# Patient Record
Sex: FEMALE | Race: BLACK OR AFRICAN AMERICAN | Employment: UNEMPLOYED | URBAN - METROPOLITAN AREA
[De-identification: names, ages, dates, MRNs, and addresses within clinical notes are randomized per-mention and may not be internally consistent; named-entity substitution may affect disease eponyms.]

---
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
## 2023-06-22 ENCOUNTER — HOSPITAL ENCOUNTER (OUTPATIENT)
Facility: HOSPITAL | Age: 18
Discharge: HOME | End: 2023-06-22
Attending: OBSTETRICS & GYNECOLOGY | Admitting: OBSTETRICS & GYNECOLOGY
Payer: COMMERCIAL

## 2023-06-22 VITALS
HEIGHT: 65 IN | RESPIRATION RATE: 17 BRPM | TEMPERATURE: 98.8 F | BODY MASS INDEX: 33.99 KG/M2 | WEIGHT: 204 LBS | OXYGEN SATURATION: 100 % | HEART RATE: 76 BPM | DIASTOLIC BLOOD PRESSURE: 62 MMHG | SYSTOLIC BLOOD PRESSURE: 104 MMHG

## 2023-06-22 PROBLEM — O26.899 ABDOMINAL PAIN AFFECTING PREGNANCY: Status: ACTIVE | Noted: 2023-06-22

## 2023-06-22 PROBLEM — R10.9 ABDOMINAL PAIN AFFECTING PREGNANCY: Status: ACTIVE | Noted: 2023-06-22

## 2023-06-22 LAB
ABO + RH BLD: NORMAL
APPEARANCE UR: CLEAR
BACTERIA URNS QL MICRO: NEGATIVE /HPF
BASOPHILS # BLD: 0 K/UL (ref 0–0.1)
BASOPHILS NFR BLD: 0 % (ref 0–1)
BILIRUB UR QL: NEGATIVE
BLOOD GROUP ANTIBODIES SERPL: NEGATIVE
COLOR UR: ABNORMAL
DIFFERENTIAL METHOD BLD: ABNORMAL
EOSINOPHIL # BLD: 0.5 K/UL (ref 0–0.4)
EOSINOPHIL NFR BLD: 5 % (ref 0–7)
EPITH CASTS URNS QL MICRO: ABNORMAL /LPF
ERYTHROCYTE [DISTWIDTH] IN BLOOD BY AUTOMATED COUNT: 14.5 % (ref 11.5–14.5)
GLUCOSE UR STRIP.AUTO-MCNC: NEGATIVE MG/DL
HCT VFR BLD AUTO: 27.3 % (ref 35–47)
HGB BLD-MCNC: 8.4 G/DL (ref 11.5–16)
HGB UR QL STRIP: NEGATIVE
HIV1 P24 AG SERPL QL IA: NONREACTIVE
HIV1+2 AB SERPL QL IA: NONREACTIVE
IMM GRANULOCYTES # BLD AUTO: 0.1 K/UL (ref 0–0.04)
IMM GRANULOCYTES NFR BLD AUTO: 1 % (ref 0–0.5)
KETONES UR QL STRIP.AUTO: 5 MG/DL
LEUKOCYTE ESTERASE UR QL STRIP.AUTO: ABNORMAL
LYMPHOCYTES # BLD: 1.2 K/UL (ref 0.8–3.5)
LYMPHOCYTES NFR BLD: 12 % (ref 12–49)
MCH RBC QN AUTO: 24.6 PG (ref 26–34)
MCHC RBC AUTO-ENTMCNC: 30.8 G/DL (ref 30–36.5)
MCV RBC AUTO: 80.1 FL (ref 80–99)
MONOCYTES # BLD: 0.6 K/UL (ref 0–1)
MONOCYTES NFR BLD: 6 % (ref 5–13)
MUCOUS THREADS URNS QL MICRO: ABNORMAL /LPF
NEUTS SEG # BLD: 7.6 K/UL (ref 1.8–8)
NEUTS SEG NFR BLD: 76 % (ref 32–75)
NITRITE UR QL STRIP.AUTO: NEGATIVE
NRBC # BLD: 0 K/UL (ref 0–0.01)
NRBC BLD-RTO: 0 PER 100 WBC
PH UR STRIP: 6 (ref 5–8)
PLATELET # BLD AUTO: 408 K/UL (ref 150–400)
PMV BLD AUTO: 9.3 FL (ref 8.9–12.9)
PROT UR STRIP-MCNC: 30 MG/DL
RBC # BLD AUTO: 3.41 M/UL (ref 3.8–5.2)
RBC #/AREA URNS HPF: ABNORMAL /HPF (ref 0–5)
SP GR UR REFRACTOMETRY: 1.03 (ref 1–1.03)
SPECIMEN EXP DATE BLD: NORMAL
UROBILINOGEN UR QL STRIP.AUTO: 4 EU/DL (ref 0.1–1)
WBC # BLD AUTO: 10 K/UL (ref 3.6–11)
WBC URNS QL MICRO: ABNORMAL /HPF (ref 0–4)

## 2023-06-22 PROCEDURE — 81001 URINALYSIS AUTO W/SCOPE: CPT

## 2023-06-22 PROCEDURE — 86803 HEPATITIS C AB TEST: CPT

## 2023-06-22 PROCEDURE — 86592 SYPHILIS TEST NON-TREP QUAL: CPT

## 2023-06-22 PROCEDURE — 86850 RBC ANTIBODY SCREEN: CPT

## 2023-06-22 PROCEDURE — 36415 COLL VENOUS BLD VENIPUNCTURE: CPT

## 2023-06-22 PROCEDURE — 99214 OFFICE O/P EST MOD 30 MIN: CPT | Performed by: OBSTETRICS & GYNECOLOGY

## 2023-06-22 PROCEDURE — 87070 CULTURE OTHR SPECIMN AEROBIC: CPT

## 2023-06-22 PROCEDURE — 96361 HYDRATE IV INFUSION ADD-ON: CPT

## 2023-06-22 PROCEDURE — 2580000003 HC RX 258: Performed by: OBSTETRICS & GYNECOLOGY

## 2023-06-22 PROCEDURE — 87389 HIV-1 AG W/HIV-1&-2 AB AG IA: CPT

## 2023-06-22 PROCEDURE — 85025 COMPLETE CBC W/AUTO DIFF WBC: CPT

## 2023-06-22 PROCEDURE — 96360 HYDRATION IV INFUSION INIT: CPT

## 2023-06-22 PROCEDURE — 99285 EMERGENCY DEPT VISIT HI MDM: CPT

## 2023-06-22 PROCEDURE — 86901 BLOOD TYPING SEROLOGIC RH(D): CPT

## 2023-06-22 PROCEDURE — 87340 HEPATITIS B SURFACE AG IA: CPT

## 2023-06-22 PROCEDURE — 86900 BLOOD TYPING SEROLOGIC ABO: CPT

## 2023-06-22 PROCEDURE — 86762 RUBELLA ANTIBODY: CPT

## 2023-06-22 PROCEDURE — 4500000201 HC EMERGENCY DEPT VISIT NO LEVEL OF CARE

## 2023-06-22 RX ORDER — SODIUM CHLORIDE, SODIUM LACTATE, POTASSIUM CHLORIDE, AND CALCIUM CHLORIDE .6; .31; .03; .02 G/100ML; G/100ML; G/100ML; G/100ML
1000 INJECTION, SOLUTION INTRAVENOUS ONCE
Status: COMPLETED | OUTPATIENT
Start: 2023-06-22 | End: 2023-06-22

## 2023-06-22 RX ORDER — ONDANSETRON 2 MG/ML
4 INJECTION INTRAMUSCULAR; INTRAVENOUS EVERY 6 HOURS PRN
Status: DISCONTINUED | OUTPATIENT
Start: 2023-06-22 | End: 2023-06-22 | Stop reason: HOSPADM

## 2023-06-22 RX ORDER — ONDANSETRON 4 MG/1
4 TABLET, ORALLY DISINTEGRATING ORAL EVERY 8 HOURS PRN
Status: DISCONTINUED | OUTPATIENT
Start: 2023-06-22 | End: 2023-06-22 | Stop reason: HOSPADM

## 2023-06-22 RX ORDER — SODIUM CHLORIDE, SODIUM LACTATE, POTASSIUM CHLORIDE, CALCIUM CHLORIDE 600; 310; 30; 20 MG/100ML; MG/100ML; MG/100ML; MG/100ML
INJECTION, SOLUTION INTRAVENOUS CONTINUOUS
Status: DISCONTINUED | OUTPATIENT
Start: 2023-06-22 | End: 2023-06-22 | Stop reason: HOSPADM

## 2023-06-22 RX ADMIN — SODIUM CHLORIDE, POTASSIUM CHLORIDE, SODIUM LACTATE AND CALCIUM CHLORIDE: 600; 310; 30; 20 INJECTION, SOLUTION INTRAVENOUS at 18:50

## 2023-06-22 RX ADMIN — SODIUM CHLORIDE, POTASSIUM CHLORIDE, SODIUM LACTATE AND CALCIUM CHLORIDE 1000 ML: 600; 310; 30; 20 INJECTION, SOLUTION INTRAVENOUS at 17:50

## 2023-06-22 ASSESSMENT — PAIN DESCRIPTION - FREQUENCY
FREQUENCY: INTERMITTENT
FREQUENCY: CONTINUOUS
FREQUENCY: INTERMITTENT

## 2023-06-22 ASSESSMENT — LIFESTYLE VARIABLES
HOW MANY STANDARD DRINKS CONTAINING ALCOHOL DO YOU HAVE ON A TYPICAL DAY: PATIENT DOES NOT DRINK
HOW OFTEN DO YOU HAVE A DRINK CONTAINING ALCOHOL: NEVER

## 2023-06-22 ASSESSMENT — PAIN DESCRIPTION - LOCATION
LOCATION: ABDOMEN

## 2023-06-22 ASSESSMENT — PAIN DESCRIPTION - DESCRIPTORS
DESCRIPTORS: CRAMPING

## 2023-06-22 ASSESSMENT — PAIN SCALES - GENERAL
PAINLEVEL_OUTOF10: 7

## 2023-06-22 NOTE — PROGRESS NOTES
1655: Received this 16y/o SBF from the ER in the wheelchair and accompanied by the ER PCT's to be evaluated for Decreased Fetal Movement, Abdominal Pain and Edema of her legs. Patient informed the writer that she has been having decreased fetal movement and abdominal contractions for a couple days and the edema started on Tuesday. Patient also informed the writer that she had Prenatal care initiated after 5 months. She rates her abdominal cramping at a \"7.5\". Gown on and urine specimen obtained. She positioned herself in the bed.     1707: Shift assessment was initiated and completed. Temp 98.8. Patient was oriented to the room and POC was reviewed. 1745: #20 Antoinette Amass was placed in the patient's left wrist per Zhane Chacko RN. She tolerated the procedure well and no infiltration noted. 1759.    1750: 1000ml bolus of  L/R initiated via the pump. No infiltration noted. 1759: Ve performed cervix: 3-4cm/50%/-2. .     1805: Dr. Yanet Martines called to Labor/Delivery. The writer informed the MD of the admission, the findings and the interventions performed. The following orders were received: Admit to observation status, obtain prenatal labs and perform a GBS specimen. 1850: 1000ml L/R added and infusing at 125ml/hr via the pump. No infiltration noted. 1855: Vaginal swab performed for a GBS and sent to the lab. Patient tolerated the procedure well.

## 2023-06-22 NOTE — ED TRIAGE NOTES
Pt arrives to ED with complaints of having contractions x 2 days, reports decreased fetal movement since 2 days ago. Pt reports she is 39 weeks pregnant today.

## 2023-06-23 LAB
HBV SURFACE AG SER QL: <0.1 INDEX
HBV SURFACE AG SER QL: NEGATIVE
HCV AB SER IA-ACNC: <0.02 INDEX
HCV AB SERPL QL IA: NONREACTIVE
RUBV IGG SERPL IA-ACNC: NORMAL IU/ML

## 2023-06-23 NOTE — PROGRESS NOTES
1905 - Bedside shift report received from Jayleen Sanford RN. Pt resting quietly. 2038 - Dr. Cecily Arce at bedside to see patient, SVE performed as: 3/50/-2. POC for pt to be discharged home and to call MD's office tomorrow morning discussed with the pt. Pt verbalized understanding. Questions addressed. Strip and labs reviewed by MD.    2108 - Discharge instructions discussed with the pt including fetal kick counts and pregnancy precautions. Instructed to keep herself hydrated and to call the office in the morning to schedule an appointment. Pt verbalized understanding. 2113 - Discharge papers signed and given to pt.    2127 - Pt declined use of wheelchair, ambulated off unit with her belongings.

## 2023-06-23 NOTE — H&P
Department of Obstetrics and Gynecology  Labor and Delivery  Attending Triage Note      SUBJECTIVE:  contractions  Limited prenatal care, c/o contractions x 1 day, no lof no bleeding.  Recent delivery 2022, +FM    OBJECTIVE    Vitals:  BP (!) 104/56   Pulse 66   Temp 98.8 °F (37.1 °C) (Oral)   Resp 16   Ht 5' 5\" (1.651 m)   Wt 204 lb (92.5 kg)   SpO2 100%   BMI 33.95 kg/m²     CONSTITUTIONAL:  awake, alert, cooperative, no apparent distress, and appears stated age  ABDOMEN:  soft, non-distended, non-tender, no masses palpated, no hepatosplenomegally    Cervix:             Dilation:  3 cm         Effacement:  50%         Station:  -4 cm         Consistency:  soft         Position:  posterior    Fetal Position:  Cephalic    Membranes:  Intact    Fetal heart rate:         Baseline Heart Rate:  150s        Accelerations:  present       Decelerations:  none       Variability:  moderate    Contraction frequency: irritability, irregular contractions        DATA:  Prenatal labs drawn    ASSESSMENT & PLAN:      Principal Problem:    Abdominal pain affecting pregnancy, false labor  Insufficient prenatal care  Plan: observation, IV hydration, patient to be discharge with office follow up and precautions

## 2023-06-24 LAB
BACTERIA SPEC CULT: NORMAL
Lab: NORMAL

## 2023-06-25 LAB — RPR SER QL: NON REACTIVE

## 2023-06-26 LAB
BACTERIA SPEC CULT: NORMAL
Lab: NORMAL

## 2023-06-28 ENCOUNTER — HOSPITAL ENCOUNTER (OUTPATIENT)
Facility: HOSPITAL | Age: 18
Setting detail: OBSERVATION
Discharge: HOME | End: 2023-06-28
Attending: OBSTETRICS & GYNECOLOGY | Admitting: OBSTETRICS & GYNECOLOGY
Payer: COMMERCIAL

## 2023-06-28 VITALS
TEMPERATURE: 98.1 F | SYSTOLIC BLOOD PRESSURE: 135 MMHG | HEART RATE: 89 BPM | DIASTOLIC BLOOD PRESSURE: 65 MMHG | RESPIRATION RATE: 16 BRPM | OXYGEN SATURATION: 100 %

## 2023-06-28 PROBLEM — Z33.1 PREGNANT STATE, INCIDENTAL: Status: ACTIVE | Noted: 2023-06-28

## 2023-06-28 PROCEDURE — 99285 EMERGENCY DEPT VISIT HI MDM: CPT

## 2023-06-28 PROCEDURE — G0378 HOSPITAL OBSERVATION PER HR: HCPCS

## 2023-06-28 PROCEDURE — 4500000002 HC ER NO CHARGE

## 2023-06-28 RX ORDER — ONDANSETRON 2 MG/ML
4 INJECTION INTRAMUSCULAR; INTRAVENOUS EVERY 6 HOURS PRN
Status: DISCONTINUED | OUTPATIENT
Start: 2023-06-28 | End: 2023-06-28 | Stop reason: HOSPADM

## 2023-06-28 RX ORDER — ONDANSETRON 4 MG/1
4 TABLET, ORALLY DISINTEGRATING ORAL EVERY 8 HOURS PRN
Status: DISCONTINUED | OUTPATIENT
Start: 2023-06-28 | End: 2023-06-28 | Stop reason: HOSPADM

## 2023-07-07 ENCOUNTER — ANESTHESIA (OUTPATIENT)
Facility: HOSPITAL | Age: 18
DRG: 560 | End: 2023-07-07
Payer: COMMERCIAL

## 2023-07-07 ENCOUNTER — HOSPITAL ENCOUNTER (INPATIENT)
Facility: HOSPITAL | Age: 18
LOS: 2 days | Discharge: HOME | DRG: 560 | End: 2023-07-09
Attending: OBSTETRICS & GYNECOLOGY | Admitting: OBSTETRICS & GYNECOLOGY
Payer: COMMERCIAL

## 2023-07-07 ENCOUNTER — ANESTHESIA EVENT (OUTPATIENT)
Facility: HOSPITAL | Age: 18
DRG: 560 | End: 2023-07-07
Payer: COMMERCIAL

## 2023-07-07 DIAGNOSIS — G89.18 POSTOPERATIVE PAIN: Primary | ICD-10-CM

## 2023-07-07 PROBLEM — Z3A.40 40 WEEKS GESTATION OF PREGNANCY: Status: ACTIVE | Noted: 2023-07-07

## 2023-07-07 LAB
ABO + RH BLD: NORMAL
AMPHET UR QL SCN: NEGATIVE
BARBITURATES UR QL SCN: NEGATIVE
BENZODIAZ UR QL: NEGATIVE
BLOOD GROUP ANTIBODIES SERPL: NEGATIVE
CANNABINOIDS UR QL SCN: POSITIVE
COCAINE UR QL SCN: NEGATIVE
ERYTHROCYTE [DISTWIDTH] IN BLOOD BY AUTOMATED COUNT: 14.6 % (ref 11.5–14.5)
HCT VFR BLD AUTO: 29.3 % (ref 35–47)
HGB BLD-MCNC: 8.8 G/DL (ref 11.5–16)
Lab: ABNORMAL
MCH RBC QN AUTO: 23.4 PG (ref 26–34)
MCHC RBC AUTO-ENTMCNC: 30 G/DL (ref 30–36.5)
MCV RBC AUTO: 77.9 FL (ref 80–99)
METHADONE UR QL: NEGATIVE
NRBC # BLD: 0 K/UL (ref 0–0.01)
NRBC BLD-RTO: 0 PER 100 WBC
OPIATES UR QL: NEGATIVE
PCP UR QL: NEGATIVE
PLATELET # BLD AUTO: 419 K/UL (ref 150–400)
PMV BLD AUTO: 9 FL (ref 8.9–12.9)
RBC # BLD AUTO: 3.76 M/UL (ref 3.8–5.2)
SPECIMEN EXP DATE BLD: NORMAL
WBC # BLD AUTO: 12.7 K/UL (ref 3.6–11)

## 2023-07-07 PROCEDURE — 1120000000 HC RM PRIVATE OB

## 2023-07-07 PROCEDURE — 7210000100 HC LABOR FEE PER 1 HR

## 2023-07-07 PROCEDURE — 6370000000 HC RX 637 (ALT 250 FOR IP): Performed by: OBSTETRICS & GYNECOLOGY

## 2023-07-07 PROCEDURE — 99283 EMERGENCY DEPT VISIT LOW MDM: CPT

## 2023-07-07 PROCEDURE — 2580000003 HC RX 258: Performed by: OBSTETRICS & GYNECOLOGY

## 2023-07-07 PROCEDURE — 36415 COLL VENOUS BLD VENIPUNCTURE: CPT

## 2023-07-07 PROCEDURE — 80307 DRUG TEST PRSMV CHEM ANLYZR: CPT

## 2023-07-07 PROCEDURE — 86901 BLOOD TYPING SEROLOGIC RH(D): CPT

## 2023-07-07 PROCEDURE — 4A1HXCZ MONITORING OF PRODUCTS OF CONCEPTION, CARDIAC RATE, EXTERNAL APPROACH: ICD-10-PCS | Performed by: OBSTETRICS & GYNECOLOGY

## 2023-07-07 PROCEDURE — 86900 BLOOD TYPING SEROLOGIC ABO: CPT

## 2023-07-07 PROCEDURE — 85027 COMPLETE CBC AUTOMATED: CPT

## 2023-07-07 PROCEDURE — 59409 OBSTETRICAL CARE: CPT | Performed by: OBSTETRICS & GYNECOLOGY

## 2023-07-07 PROCEDURE — 86850 RBC ANTIBODY SCREEN: CPT

## 2023-07-07 PROCEDURE — 7100000000 HC PACU RECOVERY - FIRST 15 MIN

## 2023-07-07 PROCEDURE — 7100000001 HC PACU RECOVERY - ADDTL 15 MIN

## 2023-07-07 PROCEDURE — 6360000002 HC RX W HCPCS

## 2023-07-07 PROCEDURE — 7220000101 HC DELIVERY VAGINAL/SINGLE

## 2023-07-07 PROCEDURE — 3700000156 HC EPIDURAL ANESTHESIA

## 2023-07-07 RX ORDER — SODIUM CHLORIDE, SODIUM LACTATE, POTASSIUM CHLORIDE, CALCIUM CHLORIDE 600; 310; 30; 20 MG/100ML; MG/100ML; MG/100ML; MG/100ML
INJECTION, SOLUTION INTRAVENOUS CONTINUOUS
Status: DISCONTINUED | OUTPATIENT
Start: 2023-07-07 | End: 2023-07-07

## 2023-07-07 RX ORDER — METHYLERGONOVINE MALEATE 0.2 MG/ML
200 INJECTION INTRAVENOUS PRN
Status: DISCONTINUED | OUTPATIENT
Start: 2023-07-07 | End: 2023-07-07

## 2023-07-07 RX ORDER — DOCUSATE SODIUM 100 MG/1
100 CAPSULE, LIQUID FILLED ORAL 2 TIMES DAILY
Status: DISCONTINUED | OUTPATIENT
Start: 2023-07-07 | End: 2023-07-07

## 2023-07-07 RX ORDER — IBUPROFEN 600 MG/1
600 TABLET ORAL 3 TIMES DAILY PRN
Qty: 30 TABLET | Refills: 0 | Status: SHIPPED | OUTPATIENT
Start: 2023-07-07

## 2023-07-07 RX ORDER — SODIUM CHLORIDE, SODIUM LACTATE, POTASSIUM CHLORIDE, AND CALCIUM CHLORIDE .6; .31; .03; .02 G/100ML; G/100ML; G/100ML; G/100ML
1000 INJECTION, SOLUTION INTRAVENOUS PRN
Status: DISCONTINUED | OUTPATIENT
Start: 2023-07-07 | End: 2023-07-07

## 2023-07-07 RX ORDER — FENTANYL/BUPIVACAINE/NS/PF 2MCG/ML-.1
10 PLASTIC BAG, INJECTION (ML) INJECTION CONTINUOUS
OUTPATIENT
Start: 2023-07-07 | End: 2023-07-09

## 2023-07-07 RX ORDER — ACETAMINOPHEN 325 MG/1
650 TABLET ORAL EVERY 4 HOURS PRN
Status: DISCONTINUED | OUTPATIENT
Start: 2023-07-07 | End: 2023-07-07

## 2023-07-07 RX ORDER — IBUPROFEN 600 MG/1
600 TABLET ORAL EVERY 8 HOURS SCHEDULED
Status: DISCONTINUED | OUTPATIENT
Start: 2023-07-07 | End: 2023-07-09 | Stop reason: HOSPADM

## 2023-07-07 RX ORDER — BUPIVACAINE HYDROCHLORIDE 2.5 MG/ML
INJECTION, SOLUTION EPIDURAL; INFILTRATION; INTRACAUDAL
Status: DISPENSED
Start: 2023-07-07 | End: 2023-07-07

## 2023-07-07 RX ORDER — SODIUM CHLORIDE 9 MG/ML
INJECTION, SOLUTION INTRAVENOUS PRN
Status: DISCONTINUED | OUTPATIENT
Start: 2023-07-07 | End: 2023-07-09

## 2023-07-07 RX ORDER — MISOPROSTOL 200 UG/1
800 TABLET ORAL PRN
Status: DISCONTINUED | OUTPATIENT
Start: 2023-07-07 | End: 2023-07-07

## 2023-07-07 RX ORDER — SODIUM CHLORIDE 0.9 % (FLUSH) 0.9 %
5-40 SYRINGE (ML) INJECTION PRN
Status: DISCONTINUED | OUTPATIENT
Start: 2023-07-07 | End: 2023-07-09

## 2023-07-07 RX ORDER — CARBOPROST TROMETHAMINE 250 UG/ML
250 INJECTION, SOLUTION INTRAMUSCULAR PRN
Status: DISCONTINUED | OUTPATIENT
Start: 2023-07-07 | End: 2023-07-07

## 2023-07-07 RX ORDER — SODIUM CHLORIDE, SODIUM LACTATE, POTASSIUM CHLORIDE, AND CALCIUM CHLORIDE .6; .31; .03; .02 G/100ML; G/100ML; G/100ML; G/100ML
1000 INJECTION, SOLUTION INTRAVENOUS ONCE
Status: COMPLETED | OUTPATIENT
Start: 2023-07-07 | End: 2023-07-07

## 2023-07-07 RX ORDER — DOCUSATE SODIUM 100 MG/1
100 CAPSULE, LIQUID FILLED ORAL 2 TIMES DAILY
Status: DISCONTINUED | OUTPATIENT
Start: 2023-07-07 | End: 2023-07-09 | Stop reason: HOSPADM

## 2023-07-07 RX ORDER — ONDANSETRON 2 MG/ML
4 INJECTION INTRAMUSCULAR; INTRAVENOUS EVERY 6 HOURS PRN
Status: DISCONTINUED | OUTPATIENT
Start: 2023-07-07 | End: 2023-07-09 | Stop reason: HOSPADM

## 2023-07-07 RX ORDER — FENTANYL CITRATE 50 UG/ML
100 INJECTION, SOLUTION INTRAMUSCULAR; INTRAVENOUS
Status: DISCONTINUED | OUTPATIENT
Start: 2023-07-07 | End: 2023-07-07

## 2023-07-07 RX ORDER — SODIUM CHLORIDE 0.9 % (FLUSH) 0.9 %
5-40 SYRINGE (ML) INJECTION EVERY 12 HOURS SCHEDULED
Status: DISCONTINUED | OUTPATIENT
Start: 2023-07-07 | End: 2023-07-09

## 2023-07-07 RX ORDER — SODIUM CHLORIDE 9 MG/ML
25 INJECTION, SOLUTION INTRAVENOUS PRN
Status: DISCONTINUED | OUTPATIENT
Start: 2023-07-07 | End: 2023-07-07

## 2023-07-07 RX ORDER — SODIUM CHLORIDE 0.9 % (FLUSH) 0.9 %
5-40 SYRINGE (ML) INJECTION PRN
Status: DISCONTINUED | OUTPATIENT
Start: 2023-07-07 | End: 2023-07-07

## 2023-07-07 RX ORDER — OXYCODONE HYDROCHLORIDE AND ACETAMINOPHEN 5; 325 MG/1; MG/1
1 TABLET ORAL EVERY 6 HOURS PRN
Qty: 15 TABLET | Refills: 0 | Status: SHIPPED | OUTPATIENT
Start: 2023-07-07 | End: 2023-07-12

## 2023-07-07 RX ORDER — MODIFIED LANOLIN
OINTMENT (GRAM) TOPICAL PRN
Status: DISCONTINUED | OUTPATIENT
Start: 2023-07-07 | End: 2023-07-09 | Stop reason: HOSPADM

## 2023-07-07 RX ORDER — SODIUM CHLORIDE 0.9 % (FLUSH) 0.9 %
5-40 SYRINGE (ML) INJECTION EVERY 12 HOURS SCHEDULED
Status: DISCONTINUED | OUTPATIENT
Start: 2023-07-07 | End: 2023-07-07

## 2023-07-07 RX ORDER — SODIUM CHLORIDE, SODIUM LACTATE, POTASSIUM CHLORIDE, AND CALCIUM CHLORIDE .6; .31; .03; .02 G/100ML; G/100ML; G/100ML; G/100ML
500 INJECTION, SOLUTION INTRAVENOUS PRN
Status: DISCONTINUED | OUTPATIENT
Start: 2023-07-07 | End: 2023-07-07

## 2023-07-07 RX ORDER — ONDANSETRON 2 MG/ML
4 INJECTION INTRAMUSCULAR; INTRAVENOUS EVERY 6 HOURS PRN
Status: DISCONTINUED | OUTPATIENT
Start: 2023-07-07 | End: 2023-07-07

## 2023-07-07 RX ADMIN — IBUPROFEN 600 MG: 600 TABLET ORAL at 23:47

## 2023-07-07 RX ADMIN — IBUPROFEN 600 MG: 600 TABLET ORAL at 17:29

## 2023-07-07 RX ADMIN — SODIUM CHLORIDE, POTASSIUM CHLORIDE, SODIUM LACTATE AND CALCIUM CHLORIDE 1000 ML: 600; 310; 30; 20 INJECTION, SOLUTION INTRAVENOUS at 06:03

## 2023-07-07 RX ADMIN — Medication 10 UNITS: at 07:22

## 2023-07-07 RX ADMIN — DOCUSATE SODIUM 100 MG: 100 CAPSULE, LIQUID FILLED ORAL at 21:02

## 2023-07-07 RX ADMIN — IBUPROFEN 600 MG: 600 TABLET ORAL at 10:05

## 2023-07-07 ASSESSMENT — PAIN SCALES - GENERAL
PAINLEVEL_OUTOF10: 5
PAINLEVEL_OUTOF10: 4

## 2023-07-07 ASSESSMENT — PAIN DESCRIPTION - LOCATION
LOCATION: ABDOMEN
LOCATION: PELVIS

## 2023-07-07 ASSESSMENT — PAIN DESCRIPTION - ORIENTATION: ORIENTATION: MID

## 2023-07-07 ASSESSMENT — PAIN DESCRIPTION - DESCRIPTORS
DESCRIPTORS: CRAMPING
DESCRIPTORS: CRAMPING

## 2023-07-08 PROCEDURE — 1120000000 HC RM PRIVATE OB

## 2023-07-08 PROCEDURE — 6370000000 HC RX 637 (ALT 250 FOR IP): Performed by: OBSTETRICS & GYNECOLOGY

## 2023-07-08 RX ORDER — ACETAMINOPHEN 500 MG
1000 TABLET ORAL EVERY 6 HOURS PRN
Status: DISCONTINUED | OUTPATIENT
Start: 2023-07-08 | End: 2023-07-09 | Stop reason: HOSPADM

## 2023-07-08 RX ADMIN — ACETAMINOPHEN 1000 MG: 500 TABLET ORAL at 09:17

## 2023-07-08 RX ADMIN — IBUPROFEN 600 MG: 600 TABLET ORAL at 15:55

## 2023-07-08 RX ADMIN — ACETAMINOPHEN 1000 MG: 500 TABLET ORAL at 21:17

## 2023-07-08 RX ADMIN — ACETAMINOPHEN 1000 MG: 500 TABLET ORAL at 01:53

## 2023-07-08 RX ADMIN — IBUPROFEN 600 MG: 600 TABLET ORAL at 06:39

## 2023-07-08 ASSESSMENT — PAIN - FUNCTIONAL ASSESSMENT: PAIN_FUNCTIONAL_ASSESSMENT: ACTIVITIES ARE NOT PREVENTED

## 2023-07-08 ASSESSMENT — PAIN SCALES - GENERAL
PAINLEVEL_OUTOF10: 5
PAINLEVEL_OUTOF10: 3
PAINLEVEL_OUTOF10: 5

## 2023-07-08 ASSESSMENT — PAIN DESCRIPTION - DESCRIPTORS
DESCRIPTORS: CRAMPING
DESCRIPTORS: CRAMPING

## 2023-07-08 ASSESSMENT — PAIN DESCRIPTION - LOCATION
LOCATION: ABDOMEN
LOCATION: ABDOMEN

## 2023-07-08 ASSESSMENT — PAIN DESCRIPTION - ORIENTATION: ORIENTATION: LOWER

## 2023-07-09 VITALS
OXYGEN SATURATION: 100 % | WEIGHT: 204 LBS | BODY MASS INDEX: 33.99 KG/M2 | RESPIRATION RATE: 18 BRPM | HEART RATE: 66 BPM | TEMPERATURE: 98.3 F | DIASTOLIC BLOOD PRESSURE: 74 MMHG | HEIGHT: 65 IN | SYSTOLIC BLOOD PRESSURE: 121 MMHG

## 2023-07-09 PROCEDURE — 6370000000 HC RX 637 (ALT 250 FOR IP): Performed by: OBSTETRICS & GYNECOLOGY

## 2023-07-09 RX ADMIN — IBUPROFEN 600 MG: 600 TABLET ORAL at 02:56

## 2023-07-09 ASSESSMENT — PAIN SCALES - GENERAL
PAINLEVEL_OUTOF10: 6
PAINLEVEL_OUTOF10: 0

## 2023-07-09 ASSESSMENT — PAIN DESCRIPTION - DESCRIPTORS: DESCRIPTORS: CRAMPING

## 2023-07-09 ASSESSMENT — PAIN DESCRIPTION - LOCATION: LOCATION: ABDOMEN

## 2024-05-11 ENCOUNTER — HOSPITAL ENCOUNTER (EMERGENCY)
Facility: HOSPITAL | Age: 19
Discharge: HOME | End: 2024-05-11
Payer: COMMERCIAL

## 2024-05-11 VITALS
RESPIRATION RATE: 16 BRPM | BODY MASS INDEX: 31.65 KG/M2 | HEIGHT: 65 IN | TEMPERATURE: 98 F | DIASTOLIC BLOOD PRESSURE: 50 MMHG | OXYGEN SATURATION: 98 % | HEART RATE: 64 BPM | WEIGHT: 190 LBS | SYSTOLIC BLOOD PRESSURE: 91 MMHG

## 2024-05-11 DIAGNOSIS — Z11.3 SCREEN FOR STD (SEXUALLY TRANSMITTED DISEASE): Primary | ICD-10-CM

## 2024-05-11 LAB
APPEARANCE UR: CLEAR
BACTERIA URNS QL MICRO: NEGATIVE /HPF
BILIRUB UR QL: NEGATIVE
CLUE CELLS VAG QL WET PREP: NORMAL
COLOR UR: ABNORMAL
EPITH CASTS URNS QL MICRO: ABNORMAL /LPF
GLUCOSE UR STRIP.AUTO-MCNC: NEGATIVE MG/DL
HGB UR QL STRIP: NEGATIVE
KETONES UR QL STRIP.AUTO: NEGATIVE MG/DL
LEUKOCYTE ESTERASE UR QL STRIP.AUTO: NEGATIVE
MUCOUS THREADS URNS QL MICRO: ABNORMAL /LPF
NITRITE UR QL STRIP.AUTO: NEGATIVE
PH UR STRIP: 6 (ref 5–8)
PROT UR STRIP-MCNC: NEGATIVE MG/DL
RBC #/AREA URNS HPF: ABNORMAL /HPF (ref 0–5)
SP GR UR REFRACTOMETRY: 1.02 (ref 1–1.03)
T VAGINALIS VAG QL WET PREP: NORMAL
URINE CULTURE IF INDICATED: ABNORMAL
UROBILINOGEN UR QL STRIP.AUTO: 2 EU/DL (ref 0.1–1)
WBC URNS QL MICRO: ABNORMAL /HPF (ref 0–4)
YEAST: NORMAL

## 2024-05-11 PROCEDURE — 87491 CHLMYD TRACH DNA AMP PROBE: CPT

## 2024-05-11 PROCEDURE — 99283 EMERGENCY DEPT VISIT LOW MDM: CPT

## 2024-05-11 PROCEDURE — 87591 N.GONORRHOEAE DNA AMP PROB: CPT

## 2024-05-11 PROCEDURE — 87210 SMEAR WET MOUNT SALINE/INK: CPT

## 2024-05-11 PROCEDURE — 81001 URINALYSIS AUTO W/SCOPE: CPT

## 2024-05-11 RX ORDER — DOXYCYCLINE HYCLATE 100 MG
100 TABLET ORAL 2 TIMES DAILY
Qty: 20 TABLET | Refills: 0 | Status: SHIPPED | OUTPATIENT
Start: 2024-05-11 | End: 2024-05-21

## 2024-05-11 ASSESSMENT — PAIN - FUNCTIONAL ASSESSMENT
PAIN_FUNCTIONAL_ASSESSMENT: NONE - DENIES PAIN
PAIN_FUNCTIONAL_ASSESSMENT: NONE - DENIES PAIN

## 2024-05-11 NOTE — ED NOTES
Provider aware of BP. Provider talked to pt about it. Pt denies any symptoms. Pt advised to come back if symptoms do arise.     Provider states that it's okay to discharge patient.

## 2024-05-11 NOTE — ED PROVIDER NOTES
[facility] EMERGENCY DEPT  EMERGENCY DEPARTMENT HISTORY AND PHYSICAL EXAM      Date: 5/11/2024  Patient Name: Avila Bunch  MRN: [id number]  YOB: 2005  Date of evaluation: 5/11/2024  Provider: Trish Chavis PA-C   Note Started: 1:56 PM EDT 5/11/24    HISTORY OF PRESENT ILLNESS     Chief Complaint   Patient presents with   • Exposure to STD       History Provided By: Patient    HPI: Avila Bunch is a 18 y.o. female with past medical history of anemia, presents for exposure to STD.  Patient arrives for evaluation with significant other is currently having penile discharge.  Patient denies any symptoms at this time.    PAST MEDICAL HISTORY   Past Medical History:  Past Medical History:   Diagnosis Date   • Anemia        Past Surgical History:  No past surgical history on file.    Family History:  Family History   Problem Relation Age of Onset   • No Known Problems Father    • No Known Problems Sister    • No Known Problems Brother        Social History:  Social History     Tobacco Use   • Smoking status: Never   • Smokeless tobacco: Never   Substance Use Topics   • Alcohol use: Not Currently   • Drug use: Not Currently       Allergies:  No Known Allergies    PCP: Luis Eduardo Willard MD    Current Meds:   No current facility-administered medications for this encounter.     Current Outpatient Medications   Medication Sig Dispense Refill   • doxycycline hyclate (VIBRA-TABS) 100 MG tablet Take 1 tablet by mouth 2 times daily for 10 days 20 tablet 0   • ibuprofen (ADVIL;MOTRIN) 600 MG tablet Take 1 tablet by mouth 3 times daily as needed for Pain 30 tablet 0   • Prenatal Vit-Fe Fumarate-FA (PRENATAL 1+1 PO) Take by mouth         Social Determinants of Health:   Social Determinants of Health     Tobacco Use: Low Risk  (7/7/2023)    Patient History    • Smoking Tobacco Use: Never    • Smokeless Tobacco Use: Never    • Passive Exposure: Not on file   Alcohol Use: Not At Risk (5/11/2024)    AUDIT-C    • Frequency of Alcohol  Never     Average Number of Drinks: Patient does not drink     Frequency of Binge Drinking: Never   Financial Resource Strain: Not on file   Food Insecurity: Not on file   Transportation Needs: Not on file   Physical Activity: Not on file   Stress: Not on file   Social Connections: Not on file   Intimate Partner Violence: Not on file   Depression: Not on file   Housing Stability: Not on file   Postpartum Depression: Medium Risk (2023)    [place]  Depression Scale     Last EPDS Total Score: 9     Last EPDS Self Harm Result: Never   Utilities: Not on file   Interpersonal Safety: Not At Risk (2023)    Interpersonal Safety Domain Source: IP Abuse Screening     Physical abuse: Denies     Verbal abuse: Denies     Emotional abuse: Denies     Financial abuse: Denies     Sexual abuse: Denies       PHYSICAL EXAM   Physical Exam  Vitals and nursing note reviewed.   Constitutional:       General: She is not in acute distress.     Appearance: Normal appearance. She is not ill-appearing.   HENT:      Head: Normocephalic and atraumatic.   Cardiovascular:      Rate and Rhythm: Normal rate and regular rhythm.      Pulses: Normal pulses.      Heart sounds: Normal heart sounds.   Pulmonary:      Effort: Pulmonary effort is normal. No respiratory distress.      Breath sounds: Normal breath sounds. No wheezing.   Abdominal:      General: Abdomen is flat. Bowel sounds are normal.      Palpations: Abdomen is soft.      Tenderness: There is no abdominal tenderness.   Neurological:      Mental Status: She is alert.       SCREENINGS                  LAB, EKG AND DIAGNOSTIC RESULTS   Labs:  Recent Results (from the past 12 hour(s))   Wet prep, genital    Collection Time: 24  1:46 PM    Specimen: Vaginal   Result Value Ref Range    Clue Cells, Wet Prep Clue Cells present      Trich, Wet Prep NONE SEEN      Yeast, UA NONE SEEN     Urinalysis with Reflex to Culture    Collection Time: 24  1:46 PM    Specimen: Urine

## 2024-05-12 LAB
C TRACH DNA SPEC QL NAA+PROBE: POSITIVE
N GONORRHOEA DNA SPEC QL NAA+PROBE: POSITIVE
SAMPLE TYPE: ABNORMAL
SERVICE CMNT-IMP: ABNORMAL
SPECIMEN SOURCE: ABNORMAL

## 2024-05-15 ENCOUNTER — HOSPITAL ENCOUNTER (EMERGENCY)
Facility: HOSPITAL | Age: 19
Discharge: HOME | End: 2024-05-15
Payer: COMMERCIAL

## 2024-05-15 VITALS
HEART RATE: 68 BPM | RESPIRATION RATE: 18 BRPM | WEIGHT: 190 LBS | BODY MASS INDEX: 31.65 KG/M2 | HEIGHT: 65 IN | TEMPERATURE: 98.2 F | DIASTOLIC BLOOD PRESSURE: 70 MMHG | SYSTOLIC BLOOD PRESSURE: 111 MMHG | OXYGEN SATURATION: 100 %

## 2024-05-15 DIAGNOSIS — Z20.2 EXPOSURE TO STD: Primary | ICD-10-CM

## 2024-05-15 LAB
APPEARANCE UR: ABNORMAL
BACTERIA URNS QL MICRO: NEGATIVE /HPF
BILIRUB UR QL: NEGATIVE
COLOR UR: ABNORMAL
EPITH CASTS URNS QL MICRO: ABNORMAL /LPF
GLUCOSE UR STRIP.AUTO-MCNC: NEGATIVE MG/DL
HCG UR QL: NEGATIVE
HGB UR QL STRIP: NEGATIVE
KETONES UR QL STRIP.AUTO: NEGATIVE MG/DL
LEUKOCYTE ESTERASE UR QL STRIP.AUTO: NEGATIVE
MUCOUS THREADS URNS QL MICRO: ABNORMAL /LPF
NITRITE UR QL STRIP.AUTO: NEGATIVE
PH UR STRIP: 5 (ref 5–8)
PROT UR STRIP-MCNC: 30 MG/DL
RBC #/AREA URNS HPF: ABNORMAL /HPF (ref 0–5)
SP GR UR REFRACTOMETRY: >1.03 (ref 1–1.03)
URINE CULTURE IF INDICATED: ABNORMAL
UROBILINOGEN UR QL STRIP.AUTO: 4 EU/DL (ref 0.1–1)
WBC URNS QL MICRO: ABNORMAL /HPF (ref 0–4)

## 2024-05-15 PROCEDURE — 2500000003 HC RX 250 WO HCPCS

## 2024-05-15 PROCEDURE — 81001 URINALYSIS AUTO W/SCOPE: CPT

## 2024-05-15 PROCEDURE — 81025 URINE PREGNANCY TEST: CPT

## 2024-05-15 PROCEDURE — 6360000002 HC RX W HCPCS

## 2024-05-15 PROCEDURE — 99284 EMERGENCY DEPT VISIT MOD MDM: CPT

## 2024-05-15 PROCEDURE — 96372 THER/PROPH/DIAG INJ SC/IM: CPT

## 2024-05-15 RX ADMIN — LIDOCAINE HYDROCHLORIDE 500 MG: 10 INJECTION, SOLUTION EPIDURAL; INFILTRATION; INTRACAUDAL; PERINEURAL at 17:49

## 2024-05-15 ASSESSMENT — LIFESTYLE VARIABLES
HOW OFTEN DO YOU HAVE A DRINK CONTAINING ALCOHOL: PATIENT DECLINED
HOW MANY STANDARD DRINKS CONTAINING ALCOHOL DO YOU HAVE ON A TYPICAL DAY: PATIENT DECLINED

## 2024-05-15 NOTE — ED PROVIDER NOTES
[facility] EMERGENCY DEPT  EMERGENCY DEPARTMENT HISTORY AND PHYSICAL EXAM      Date: 5/15/2024  Patient Name: Avila Bunch  MRN: [id number]  Birthdate 2005  Date of evaluation: 5/15/2024  Provider: GERALDINE Rees   Note Started: 4:37 PM EDT 5/15/24    HISTORY OF PRESENT ILLNESS     Chief Complaint   Patient presents with    Exposure to STD       History Provided By: Patient    HPI: Avila Bunch is a 18 y.o. female with past medical history as listed below who presents to the ED today for follow-up on exposure to an STD.  She was seen in the ED on 5/11 and tested for gonorrhea/chlamydia.  Both test results are positive.  She did  doxycycline from the pharmacy and has been taking it as prescribed, however she never received the ceftriaxone injection.  She denies any chest pain, shortness of breath, abdominal pain, burning with urination, or change in vaginal discharge.  She is requesting a pregnancy test today.    PAST MEDICAL HISTORY   Past Medical History:  Past Medical History:   Diagnosis Date    Anemia        Past Surgical History:  History reviewed. No pertinent surgical history.    Family History:  Family History   Problem Relation Age of Onset    No Known Problems Father     No Known Problems Sister     No Known Problems Brother        Social History:  Social History     Tobacco Use    Smoking status: Never    Smokeless tobacco: Never   Substance Use Topics    Alcohol use: Not Currently    Drug use: Not Currently       Allergies:  No Known Allergies    PCP: Luis Eduardo Willard MD    Current Meds:   No current facility-administered medications for this encounter.     Current Outpatient Medications   Medication Sig Dispense Refill    doxycycline hyclate (VIBRA-TABS) 100 MG tablet Take 1 tablet by mouth 2 times daily for 10 days 20 tablet 0    ibuprofen (ADVIL;MOTRIN) 600 MG tablet Take 1 tablet by mouth 3 times daily as needed for Pain 30 tablet 0    Prenatal Vit-Fe Fumarate-FA (PRENATAL 1+1 PO) Take by

## 2024-06-13 ENCOUNTER — HOSPITAL ENCOUNTER (EMERGENCY)
Facility: HOSPITAL | Age: 19
Discharge: HOME | End: 2024-06-13
Payer: COMMERCIAL

## 2024-06-13 VITALS
DIASTOLIC BLOOD PRESSURE: 71 MMHG | TEMPERATURE: 98.2 F | HEIGHT: 65 IN | WEIGHT: 188.8 LBS | RESPIRATION RATE: 16 BRPM | HEART RATE: 67 BPM | OXYGEN SATURATION: 99 % | BODY MASS INDEX: 31.46 KG/M2 | SYSTOLIC BLOOD PRESSURE: 106 MMHG

## 2024-06-13 DIAGNOSIS — J02.9 SORE THROAT: Primary | ICD-10-CM

## 2024-06-13 DIAGNOSIS — R51.9 NONINTRACTABLE HEADACHE, UNSPECIFIED CHRONICITY PATTERN, UNSPECIFIED HEADACHE TYPE: ICD-10-CM

## 2024-06-13 DIAGNOSIS — J02.0 STREP PHARYNGITIS: ICD-10-CM

## 2024-06-13 LAB — HCG UR QL: NEGATIVE

## 2024-06-13 PROCEDURE — 81025 URINE PREGNANCY TEST: CPT

## 2024-06-13 PROCEDURE — 6370000000 HC RX 637 (ALT 250 FOR IP)

## 2024-06-13 PROCEDURE — 99283 EMERGENCY DEPT VISIT LOW MDM: CPT

## 2024-06-13 RX ORDER — PENICILLIN V POTASSIUM 500 MG/1
500 TABLET ORAL 2 TIMES DAILY
Qty: 20 TABLET | Refills: 0 | Status: SHIPPED | OUTPATIENT
Start: 2024-06-13 | End: 2024-06-23

## 2024-06-13 RX ORDER — IBUPROFEN 600 MG/1
600 TABLET ORAL
Status: COMPLETED | OUTPATIENT
Start: 2024-06-13 | End: 2024-06-13

## 2024-06-13 RX ORDER — ACETAMINOPHEN 500 MG
1000 TABLET ORAL
Status: COMPLETED | OUTPATIENT
Start: 2024-06-13 | End: 2024-06-13

## 2024-06-13 RX ADMIN — IBUPROFEN 600 MG: 600 TABLET, FILM COATED ORAL at 17:21

## 2024-06-13 RX ADMIN — ACETAMINOPHEN 1000 MG: 500 TABLET ORAL at 17:21

## 2024-06-13 ASSESSMENT — PAIN SCALES - GENERAL: PAINLEVEL_OUTOF10: 5

## 2024-06-13 ASSESSMENT — PAIN DESCRIPTION - LOCATION: LOCATION: THROAT

## 2024-06-13 ASSESSMENT — PAIN - FUNCTIONAL ASSESSMENT: PAIN_FUNCTIONAL_ASSESSMENT: 0-10

## 2024-06-13 NOTE — ED PROVIDER NOTES
PATIENT REFERRED TO:  Luis Eduardo Willard MD  [address]  [address]  [facility] [address] [phone]      As needed        DISCHARGE MEDICATIONS:     Medication List        START taking these medications      aspirin-acetaminophen-caffeine 250-250-65 MG per tablet  Commonly known as: Excedrin Migraine  Take 1 tablet by mouth every 8 hours as needed for Headaches     penicillin v potassium 500 MG tablet  Commonly known as: VEETID  Take 1 tablet by mouth in the morning and 1 tablet in the evening. Do all this for 10 days.     Phenol-Glycerin 1.5-33 % spray  Commonly known as: CHLORASEPTIC MAX  Take 1 spray by mouth as needed for Sore Throat            ASK your doctor about these medications      ibuprofen 600 MG tablet  Commonly known as: ADVIL;MOTRIN  Take 1 tablet by mouth 3 times daily as needed for Pain     PRENATAL 1+1 PO               Where to Get Your Medications        These medications were sent to [facility]/pharmacy [address] [place] [address] - [address] [facility] - P [phone] - F [phone]  [address] [facility] [address]      Phone: [phone]   aspirin-acetaminophen-caffeine 250-250-65 MG per tablet  penicillin v potassium 500 MG tablet  Phenol-Glycerin 1.5-33 % spray           DISCONTINUED MEDICATIONS:  Current Discharge Medication List          I am the Primary Clinician of Record: Jose Francisco Ibarra PA-C (electronically signed)    (Please note that parts of this dictation were completed with voice recognition software. Quite often unanticipated grammatical, syntax, homophones, and other interpretive errors are inadvertently transcribed by the computer software. Please disregards these errors. Please excuse any errors that have escaped final proofreading.)     Jose Francisco Ibarra PA-C  06/13/24 [id number]

## 2024-06-13 NOTE — ED TRIAGE NOTES
Reports headache & sore throat x3 days.  Also wants pregnancy test, last period was around April 22.

## 2024-06-13 NOTE — DISCHARGE INSTRUCTIONS
Thank you for choosing our Emergency Department for your care.  It is our privilege to care for you in your time of need.  In the next several days, you may receive a survey via email or mailed to your home about your experience with our team.  We would greatly appreciate you taking a few minutes to complete the survey, as we use this information to learn what we have done well and what we could be doing better. Thank you for trusting us with your care!    Below you will find a list of your tests from today's visit.   Labs  Recent Results (from the past 12 hour(s))   POC Pregnancy Urine Qual    Collection Time: 06/13/24  4:44 PM   Result Value Ref Range    Preg Test, Ur Negative Negative         Radiologic Studies  No orders to display     ------------------------------------------------------------------------------------------------------------  The evaluation and treatment you received in the Emergency Department were for an urgent problem. It is important that you follow-up with a doctor, nurse practitioner, or physician assistant to:  (1) confirm your diagnosis,  (2) re-evaluation of changes in your illness and treatment, and (3) for ongoing care. Please take your discharge instructions with you when you go to your follow-up appointment.     If you have any problem arranging a follow-up appointment, contact us!  If your symptoms become worse or you do not improve as expected, please return to us. We are available 24 hours a day.     If a prescription has been provided, please fill it as soon as possible to prevent a delay in treatment. If you have any questions or reservations about taking the medication due to side effects or interactions with other medications, please call your primary care provider or contact us directly.  Again, THANK YOU for choosing us to care for YOU!

## 2024-06-17 ENCOUNTER — HOSPITAL ENCOUNTER (EMERGENCY)
Facility: HOSPITAL | Age: 19
Discharge: HOME | End: 2024-06-17
Attending: STUDENT IN AN ORGANIZED HEALTH CARE EDUCATION/TRAINING PROGRAM
Payer: COMMERCIAL

## 2024-06-17 VITALS
HEART RATE: 68 BPM | TEMPERATURE: 97.5 F | RESPIRATION RATE: 16 BRPM | WEIGHT: 188 LBS | SYSTOLIC BLOOD PRESSURE: 112 MMHG | OXYGEN SATURATION: 100 % | BODY MASS INDEX: 31.32 KG/M2 | DIASTOLIC BLOOD PRESSURE: 73 MMHG | HEIGHT: 65 IN

## 2024-06-17 DIAGNOSIS — S91.312A LACERATION OF LEFT FOOT, INITIAL ENCOUNTER: ICD-10-CM

## 2024-06-17 DIAGNOSIS — J02.9 ACUTE PHARYNGITIS, UNSPECIFIED ETIOLOGY: Primary | ICD-10-CM

## 2024-06-17 PROCEDURE — 99283 EMERGENCY DEPT VISIT LOW MDM: CPT

## 2024-06-17 RX ORDER — PENICILLIN V POTASSIUM 500 MG/1
500 TABLET ORAL 2 TIMES DAILY
Qty: 20 TABLET | Refills: 0 | Status: SHIPPED | OUTPATIENT
Start: 2024-06-17 | End: 2024-06-27

## 2024-06-17 ASSESSMENT — PAIN SCALES - GENERAL: PAINLEVEL_OUTOF10: 6

## 2024-06-17 ASSESSMENT — PAIN - FUNCTIONAL ASSESSMENT: PAIN_FUNCTIONAL_ASSESSMENT: 0-10

## 2024-06-17 NOTE — ED PROVIDER NOTES
[facility] EMERGENCY DEPARTMENT  EMERGENCY DEPARTMENT HISTORY AND PHYSICAL EXAM      Date: 6/17/2024  Patient Name: Avila Bunch  MRN: [id number]  Birthdate 2005  Date of evaluation: 6/17/2024  Provider: Isauro Pritchard MD   Note Started: 8:54 AM EDT 6/17/24    HISTORY OF PRESENT ILLNESS     Chief Complaint   Patient presents with    Pharyngitis    skin issue       History Provided By: Patient    HPI: Avila Bunch is a 18 y.o. female with past medical history as reviewed below presents for evaluation of sore throat.  Sore throat present for the last week and has multiple sick coworkers.  Patient diagnosed with strep at her recent visit here and started antibiotics but did not finish them.  Sore throat persists.  No dysphagia or dysphonia.  No fevers or chills, no dyspnea.  Patient also has small ulceration to the left foot which she has cleaned with soap and water    PAST MEDICAL HISTORY   Past Medical History:  Past Medical History:   Diagnosis Date    Anemia        Past Surgical History:  History reviewed. No pertinent surgical history.    Family History:  Family History   Problem Relation Age of Onset    No Known Problems Father     No Known Problems Sister     No Known Problems Brother        Social History:  Social History     Tobacco Use    Smoking status: Never    Smokeless tobacco: Never   Substance Use Topics    Alcohol use: Not Currently    Drug use: Yes     Types: Marijuana (Weed)       Allergies:  No Known Allergies    PCP: Luis Eduardo Willard MD    Current Meds:   No current facility-administered medications for this encounter.     Current Outpatient Medications   Medication Sig Dispense Refill    penicillin v potassium (VEETID) 500 MG tablet Take 1 tablet by mouth in the morning and 1 tablet in the evening. Do all this for 10 days. 20 tablet 0    aspirin-acetaminophen-caffeine (EXCEDRIN MIGRAINE) 250-250-65 MG per tablet Take 1 tablet by mouth every 8 hours as needed for Headaches (Patient not

## 2024-06-17 NOTE — ED TRIAGE NOTES
Pt has had a sore throat for a week.  Lots of her coworkers have been having sore throats.  Cut her left lateral foot on a shower drain; still not healing

## 2024-11-04 ENCOUNTER — HOSPITAL ENCOUNTER (EMERGENCY)
Facility: HOSPITAL | Age: 19
Discharge: HOME | End: 2024-11-04
Payer: COMMERCIAL

## 2024-11-04 VITALS
RESPIRATION RATE: 18 BRPM | SYSTOLIC BLOOD PRESSURE: 108 MMHG | DIASTOLIC BLOOD PRESSURE: 69 MMHG | OXYGEN SATURATION: 100 % | HEART RATE: 71 BPM | BODY MASS INDEX: 30.99 KG/M2 | HEIGHT: 65 IN | TEMPERATURE: 98.3 F | WEIGHT: 186 LBS

## 2024-11-04 DIAGNOSIS — N76.0 BACTERIAL VAGINOSIS: Primary | ICD-10-CM

## 2024-11-04 DIAGNOSIS — B96.89 BACTERIAL VAGINOSIS: Primary | ICD-10-CM

## 2024-11-04 LAB
APPEARANCE UR: CLEAR
BACTERIA URNS QL MICRO: ABNORMAL /HPF
BILIRUB UR QL: NEGATIVE
CLUE CELLS VAG QL WET PREP: ABNORMAL
COLOR UR: ABNORMAL
EPITH CASTS URNS QL MICRO: ABNORMAL /LPF
GLUCOSE UR STRIP.AUTO-MCNC: NEGATIVE MG/DL
HCG UR QL: NEGATIVE
HGB UR QL STRIP: NEGATIVE
KETONES UR QL STRIP.AUTO: 15 MG/DL
LEUKOCYTE ESTERASE UR QL STRIP.AUTO: ABNORMAL
NITRITE UR QL STRIP.AUTO: NEGATIVE
PH UR STRIP: 6.5 (ref 5–8)
PROT UR STRIP-MCNC: NEGATIVE MG/DL
RBC #/AREA URNS HPF: ABNORMAL /HPF (ref 0–5)
SP GR UR REFRACTOMETRY: 1.01 (ref 1–1.03)
T VAGINALIS VAG QL WET PREP: ABNORMAL
URINE CULTURE IF INDICATED: ABNORMAL
UROBILINOGEN UR QL STRIP.AUTO: 4 EU/DL (ref 0.2–1)
WBC URNS QL MICRO: ABNORMAL /HPF (ref 0–4)
YEAST: ABNORMAL

## 2024-11-04 PROCEDURE — 87491 CHLMYD TRACH DNA AMP PROBE: CPT

## 2024-11-04 PROCEDURE — 87591 N.GONORRHOEAE DNA AMP PROB: CPT

## 2024-11-04 PROCEDURE — 99283 EMERGENCY DEPT VISIT LOW MDM: CPT

## 2024-11-04 PROCEDURE — 87210 SMEAR WET MOUNT SALINE/INK: CPT

## 2024-11-04 PROCEDURE — 81001 URINALYSIS AUTO W/SCOPE: CPT

## 2024-11-04 PROCEDURE — 81025 URINE PREGNANCY TEST: CPT

## 2024-11-04 RX ORDER — METRONIDAZOLE 500 MG/1
500 TABLET ORAL 2 TIMES DAILY
Qty: 14 TABLET | Refills: 0 | Status: SHIPPED | OUTPATIENT
Start: 2024-11-04 | End: 2024-11-11

## 2024-11-04 ASSESSMENT — PAIN - FUNCTIONAL ASSESSMENT: PAIN_FUNCTIONAL_ASSESSMENT: NONE - DENIES PAIN

## 2024-11-04 NOTE — DISCHARGE INSTRUCTIONS
Thank you for choosing our Emergency Department for your care.  It is our privilege to care for you in your time of need.  In the next several days, you may receive a survey via email or mailed to your home about your experience with our team.  We would greatly appreciate you taking a few minutes to complete the survey, as we use this information to learn what we have done well and what we could be doing better. Thank you for trusting us with your care!    Below you will find a list of your tests from today's visit.   Labs  Recent Results (from the past 12 hour(s))   Wet prep, genital    Collection Time: 11/04/24  4:45 PM    Specimen: Miscellaneous sample   Result Value Ref Range    Clue Cells, Wet Prep Clue Cells present (A) NONE SEEN      Trich, Wet Prep NONE SEEN NONE SEEN      Yeast, UA NONE SEEN NONE SEEN     Urinalysis with Reflex to Culture    Collection Time: 11/04/24  4:45 PM    Specimen: Urine   Result Value Ref Range    Color, UA Yellow/Straw      Appearance Clear Clear      Specific Gravity, UA 1.015 1.003 - 1.030      pH, Urine 6.5 5.0 - 8.0      Protein, UA Negative Negative mg/dL    Glucose, Ur Negative Negative mg/dL    Ketones, Urine 15 (A) Negative mg/dL    Bilirubin, Urine Negative Negative      Blood, Urine Negative Negative      Urobilinogen, Urine 4.0 (H) 0.2 - 1.0 EU/dL    Nitrite, Urine Negative Negative      Leukocyte Esterase, Urine Trace (A) Negative      WBC, UA 0-4 0 - 4 /hpf    RBC, UA 0-5 0 - 5 /hpf    Epithelial Cells, UA Few Few /lpf    BACTERIA, URINE 1+ (A) Negative /hpf    Urine Culture if Indicated Culture not indicated by UA result Culture not indicated by UA result     POC Pregnancy Urine Qual    Collection Time: 11/04/24  4:49 PM   Result Value Ref Range    Preg Test, Ur Negative Negative         Radiologic Studies  No orders to display     ------------------------------------------------------------------------------------------------------------  The evaluation and

## 2024-11-04 NOTE — ED PROVIDER NOTES
[facility] EMERGENCY DEPT  EMERGENCY DEPARTMENT HISTORY AND PHYSICAL EXAM      Date: 11/4/2024  Patient Name: Avila Bunch  MRN: [id number]  YOB: 2005  Date of evaluation: 11/4/2024  Provider: Denzel Storey PA-C   Note Started: 4:41 PM EST 11/4/24    HISTORY OF PRESENT ILLNESS     Chief Complaint   Patient presents with    Vaginal Odor       History Provided By: Patient    HPI: Avila Bunch is a 19 y.o. female past medical history listed below presents emerged from today with vaginal odor.  Was noted past few days once coming to get checked out.  Has not had similar symptoms in the past.  No discharge or bleeding dysuria hematuria rash or lesions.  No cough cold congestion fever chills.  No abdominal pain nausea or vomiting.    PAST MEDICAL HISTORY   Past Medical History:  Past Medical History:   Diagnosis Date    Anemia        Past Surgical History:  History reviewed. No pertinent surgical history.    Family History:  Family History   Problem Relation Age of Onset    No Known Problems Father     No Known Problems Sister     No Known Problems Brother        Social History:  Social History     Tobacco Use    Smoking status: Never    Smokeless tobacco: Never   Substance Use Topics    Alcohol use: Not Currently    Drug use: Yes     Types: Marijuana (Weed)       Allergies:  No Known Allergies    PCP: Luis Eduardo Willard MD    Current Meds:   No current facility-administered medications for this encounter.     Current Outpatient Medications   Medication Sig Dispense Refill    metroNIDAZOLE (FLAGYL) 500 MG tablet Take 1 tablet by mouth in the morning and 1 tablet in the evening. Do all this for 7 days. 14 tablet 0    aspirin-acetaminophen-caffeine (EXCEDRIN MIGRAINE) 250-250-65 MG per tablet Take 1 tablet by mouth every 8 hours as needed for Headaches (Patient not taking: Reported on 6/17/2024) 14 tablet 0    Phenol-Glycerin (CHLORASEPTIC MAX) 1.5-33 % spray Take 1 spray by mouth as needed for Sore Throat (Patient

## 2024-11-12 LAB
C TRACH DNA SPEC QL NAA+PROBE: NEGATIVE
N GONORRHOEA DNA SPEC QL NAA+PROBE: NEGATIVE
SAMPLE TYPE: NORMAL
SERVICE CMNT-IMP: NORMAL
SPECIMEN SOURCE: NORMAL

## 2025-07-12 ENCOUNTER — APPOINTMENT (OUTPATIENT)
Facility: HOSPITAL | Age: 20
End: 2025-07-12
Payer: COMMERCIAL

## 2025-07-12 ENCOUNTER — HOSPITAL ENCOUNTER (EMERGENCY)
Facility: HOSPITAL | Age: 20
Discharge: HOME | End: 2025-07-12
Attending: STUDENT IN AN ORGANIZED HEALTH CARE EDUCATION/TRAINING PROGRAM
Payer: COMMERCIAL

## 2025-07-12 VITALS
HEIGHT: 65 IN | BODY MASS INDEX: 32.99 KG/M2 | HEART RATE: 92 BPM | SYSTOLIC BLOOD PRESSURE: 122 MMHG | OXYGEN SATURATION: 100 % | TEMPERATURE: 98.6 F | DIASTOLIC BLOOD PRESSURE: 73 MMHG | RESPIRATION RATE: 17 BRPM | WEIGHT: 198 LBS

## 2025-07-12 DIAGNOSIS — O20.0 THREATENED MISCARRIAGE: Primary | ICD-10-CM

## 2025-07-12 LAB
APPEARANCE UR: ABNORMAL
BACTERIA URNS QL MICRO: NEGATIVE /HPF
BILIRUB UR QL: NEGATIVE
COLOR UR: ABNORMAL
EPITH CASTS URNS QL MICRO: ABNORMAL /LPF
GLUCOSE UR STRIP.AUTO-MCNC: NEGATIVE MG/DL
HCG SERPL-ACNC: ABNORMAL MIU/ML (ref 0–6)
HCG, URINE, POC: POSITIVE
HGB UR QL STRIP: NEGATIVE
KETONES UR QL STRIP.AUTO: NEGATIVE MG/DL
LEUKOCYTE ESTERASE UR QL STRIP.AUTO: ABNORMAL
Lab: NORMAL
MUCOUS THREADS URNS QL MICRO: ABNORMAL /LPF
NEGATIVE QC PASS/FAIL: NORMAL
NITRITE UR QL STRIP.AUTO: NEGATIVE
PH UR STRIP: 6 (ref 5–8)
POSITIVE QC PASS/FAIL: NORMAL
PROT UR STRIP-MCNC: NEGATIVE MG/DL
RBC #/AREA URNS HPF: ABNORMAL /HPF (ref 0–5)
SP GR UR REFRACTOMETRY: 1.02 (ref 1–1.03)
URINE CULTURE IF INDICATED: ABNORMAL
UROBILINOGEN UR QL STRIP.AUTO: 4 EU/DL (ref 0.1–1)
WBC URNS QL MICRO: ABNORMAL /HPF (ref 0–4)

## 2025-07-12 PROCEDURE — 81001 URINALYSIS AUTO W/SCOPE: CPT

## 2025-07-12 PROCEDURE — 84702 CHORIONIC GONADOTROPIN TEST: CPT

## 2025-07-12 PROCEDURE — 99284 EMERGENCY DEPT VISIT MOD MDM: CPT

## 2025-07-12 PROCEDURE — 76801 OB US < 14 WKS SINGLE FETUS: CPT

## 2025-07-12 ASSESSMENT — PAIN SCALES - GENERAL: PAINLEVEL_OUTOF10: 10

## 2025-07-12 ASSESSMENT — PAIN - FUNCTIONAL ASSESSMENT: PAIN_FUNCTIONAL_ASSESSMENT: 0-10

## 2025-07-12 ASSESSMENT — PAIN DESCRIPTION - LOCATION: LOCATION: ABDOMEN

## 2025-07-12 NOTE — ED TRIAGE NOTES
Patient reports vaginal bleeding x 1 week with abdominal cramping and HA. Possible chance of pregnancy but states that she is unsure.     Denies n/v/f/chills    Patient alert and oriented and ambulatory to ER5.

## 2025-07-13 NOTE — ED PROVIDER NOTES
[facility]OGEL[facility]  EMERGENCY DEPARTMENT ENCOUNTER NOTE    Date: 7/12/2025  Patient Name: Avila Bunch    History of Presenting Illness     Chief Complaint   Patient presents with    Vaginal Bleeding       History obtained from: Patient    HPI: Avila Bunch, 19 y.o. female with past medical history as listed and reviewed below presents for vaginal bleeding.  Unclear if she is pregnant, she has had vaginal bleeding for the last week, and has been slowing down, intermittent abdominal cramping is present.  Last normal menstrual period was back in May.    - Lightheadedness, dizziness, or syncope are not present.  - Dysuria & hematuria are not present.  - Fever not present.  - Trauma not present.  - No other symptoms including any trauma, coagulopathy or blood thinner use.  - Blood type: RH +    Medical History   I reviewed the medical, surgical, family, and social history, as well as allergies:    PCP: Luis Eduardo Willard MD    Past Medical History:  Past Medical History:   Diagnosis Date    Anemia      Past Surgical History:  History reviewed. No pertinent surgical history.  Current Outpatient Medications:  Current Outpatient Medications   Medication Instructions    aspirin-acetaminophen-caffeine (EXCEDRIN MIGRAINE) 250-250-65 MG per tablet 1 tablet, Oral, EVERY 8 HOURS PRN    ibuprofen (ADVIL;MOTRIN) 600 mg, Oral, 3 TIMES DAILY PRN    Phenol-Glycerin (CHLORASEPTIC MAX) 1.5-33 % spray 1 spray, Oral, PRN    Prenatal Vit-Fe Fumarate-FA (PRENATAL 1+1 PO) Take by mouth      Family History:  Family History   Problem Relation Age of Onset    No Known Problems Father     No Known Problems Sister     No Known Problems Brother      Social History:  Social History     Tobacco Use    Smoking status: Never    Smokeless tobacco: Never   Substance Use Topics    Alcohol use: Not Currently    Drug use: Yes     Types: Marijuana (Weed)     Allergies:  No Known Allergies      Physical Exam and Vital Signs

## 2025-07-13 NOTE — DISCHARGE INSTRUCTIONS
Thank you for choosing our Emergency Department for your care.  It is our privilege to care for you in your time of need.  In the next several days, you may receive a survey via email or mailed to your home about your experience with our team.  We would greatly appreciate you taking a few minutes to complete the survey, as we use this information to learn what we have done well and what we could be doing better. Thank you for trusting us with your care!     Please review [employer] for a more detailed result list since the below list may not be comprehensive. Instructions on how to sign up to [employer] should be provided in this packet.    Below you will find a list of your tests from today's visit.     Labs and Radiology Studies  Recent Results (from the past 12 hours)   Urinalysis with Reflex to Culture    Collection Time: 07/12/25  7:30 PM    Specimen: Urine   Result Value Ref Range    Color, UA Yellow/Straw      Appearance Turbid (A) Clear      Specific Gravity, UA 1.025 1.003 - 1.030      pH, Urine 6.0 5.0 - 8.0      Protein, UA Negative Negative mg/dL    Glucose, Ur Negative Negative mg/dL    Ketones, Urine Negative Negative mg/dL    Bilirubin, Urine Negative Negative      Blood, Urine Negative Negative      Urobilinogen, Urine 4.0 (H) 0.1 - 1.0 EU/dL    Nitrite, Urine Negative Negative      Leukocyte Esterase, Urine Trace (A) Negative      WBC, UA 0-4 0 - 4 /hpf    RBC, UA 0-5 0 - 5 /hpf    Epithelial Cells, UA Many (A) Few /lpf    BACTERIA, URINE Negative Negative /hpf    Urine Culture if Indicated Culture not indicated by UA result Culture not indicated by UA result      Mucus, UA Trace (A) Negative /lpf   HCG, Quantitative, Pregnancy    Collection Time: 07/12/25  7:30 PM   Result Value Ref Range    hCG Quant 41,856 (H) 0 - 6 mIU/mL     US OB LESS THAN 14 WEEKS SINGLE OR FIRST GESTATION  Result Date: 7/12/2025  EXAMINATION: US OB LESS THAN 14 WEEKS SINGLE OR FIRST GESTATION CLINICAL INDICATION: Female, 19 years

## 2025-07-16 ENCOUNTER — TELEPHONE (OUTPATIENT)
Age: 20
End: 2025-07-16

## 2025-07-16 NOTE — TELEPHONE ENCOUNTER
7/16/2025 @10:48am-Called [phone] and received a recording the person is not accepting calls at this time. Called [phone] and L/M on VM to have patient contact the office at [phone] regarding scheduling an appt per message received from Dr. Bone for a new OB.ww

## 2025-07-24 ENCOUNTER — TELEPHONE (OUTPATIENT)
Age: 20
End: 2025-07-24

## 2025-07-24 NOTE — TELEPHONE ENCOUNTER
7/24/2025-You are allowed 2 guests to accompany you to your visit as long as they are over the age of 12. Children under the age of 12 are not allowed under any circumstances. Your appointment may be rescheduled if no one is available to be seated with them in the waiting room during your appointment. Please arrive 10-15 minutes ahead of your appointment to allot enough time to update any information in our system that we may need. If you arrive 15 minutes past your appointment time, we may ask you to reschedule.  ww

## 2025-07-31 DIAGNOSIS — N91.2 AMENORRHEA: Primary | ICD-10-CM
